# Patient Record
Sex: FEMALE | Race: WHITE | NOT HISPANIC OR LATINO
[De-identification: names, ages, dates, MRNs, and addresses within clinical notes are randomized per-mention and may not be internally consistent; named-entity substitution may affect disease eponyms.]

---

## 2021-06-23 PROBLEM — Z00.00 ENCOUNTER FOR PREVENTIVE HEALTH EXAMINATION: Status: ACTIVE | Noted: 2021-06-23

## 2021-07-13 ENCOUNTER — APPOINTMENT (OUTPATIENT)
Age: 56
End: 2021-07-13

## 2021-07-15 ENCOUNTER — APPOINTMENT (OUTPATIENT)
Age: 56
End: 2021-07-15

## 2021-07-15 ENCOUNTER — NON-APPOINTMENT (OUTPATIENT)
Age: 56
End: 2021-07-15

## 2021-07-20 ENCOUNTER — APPOINTMENT (OUTPATIENT)
Age: 56
End: 2021-07-20
Payer: MEDICAID

## 2021-07-20 VITALS
HEIGHT: 63.5 IN | SYSTOLIC BLOOD PRESSURE: 119 MMHG | DIASTOLIC BLOOD PRESSURE: 80 MMHG | TEMPERATURE: 97.1 F | OXYGEN SATURATION: 96 % | HEART RATE: 106 BPM | WEIGHT: 110 LBS | BODY MASS INDEX: 19.25 KG/M2

## 2021-07-20 DIAGNOSIS — D06.9 CARCINOMA IN SITU OF CERVIX, UNSPECIFIED: ICD-10-CM

## 2021-07-20 PROCEDURE — 99205 OFFICE O/P NEW HI 60 MIN: CPT

## 2021-07-20 NOTE — HISTORY OF PRESENT ILLNESS
[FreeTextEntry1] : Problem List\par 1. CRYSTAL 3\par \par Previous Therapy \par 1. PAP 5/5//21- HSIL, HPV +\par 1. Colposcopy 6/14/21\par    a) ECC- very scant benign endocervical cells. Endocervix is not well represented\par    b) cervix @3 oclock- detached superficial fragments of HGSIL (cin3) \par    c) cervix @ 6 - benign superficial squamous epithelim\par    d) cervix @ 10 - detached superficial fragments of HSIL (CRYSTAL 2-3) \par    e) cervix @ 12 - superificial fragments of HGSIL (CIN3)

## 2021-07-20 NOTE — CHIEF COMPLAINT
[FreeTextEntry1] : 54 y/o referred from Dr. Kidd for LEEP. PAP in March HGSIL/HPV+\par \par Patient reports episode of post coital bleeding in 2017. D&C in the office which was normal. \par \par Menopause 5 years ago\par \par OBHX:  x 1 (Twins)\par GYNHX: LEEP \par \par PMHX: major depression, anxiety, hypothyroid\par SX:\par \par MED: see med rec\par ALL: Ceftan\par \par SOCIAL: , not working, rare ETOH\par FAM: mother breast cancer Age 50

## 2021-07-20 NOTE — ASSESSMENT
[FreeTextEntry1] : With the aid of diagrams we reviewed the findings in detail.  We reviewed HPV its pathogenesis and the implications of an abnormal cervical cytology and the pathogenesis of dysplasia in detail.ASCUS with high-risk HPV is associated with 4% of Pap smears.\par \par It has been reported that 40 to 58 percent of CRYSTAL 2 lesions will regress if left untreated, while 22 percent progress to CRYSTAL 3, and 5 percent progress to invasive cancer. ASCCP guidelines were reviewed with the patient. Excision procedure is recommended for both CRYSTAL 2 and CIN3. \par \par The risks and benefits of LEEP vs. CKC were discussed which include, but are not limited to: bleeding, infection, cervical stenosis, cervical insufficiency. Possibility of needing a repeat procedure or hysterectomy was also discussed. I also discussed the possibility that no abnormality will be seen on the cone specimen. In this case, her cervix is extensively involved with high grade dysplasia, and child bearing is not of concern. Therefore, I recommend CKC.\par \par Patient requesting Gardasil vaccination. It is not currently approved in her age group as there are no appropraite studies to guide safety, efficacy and dosing schedules. \par \par \par [] Medical clearance\par [] COVID\par [] Pathology review\par [] CKC next MEETH day

## 2021-07-20 NOTE — PHYSICAL EXAM
[Normal] : Recto-Vaginal Exam: Normal [de-identified] : areas of high grade dysplasia noted circumferentially a

## 2021-07-21 DIAGNOSIS — K65.8 OTHER PERITONITIS: ICD-10-CM

## 2021-07-21 DIAGNOSIS — Z80.3 FAMILY HISTORY OF MALIGNANT NEOPLASM OF BREAST: ICD-10-CM

## 2021-07-21 DIAGNOSIS — Z86.79 PERSONAL HISTORY OF OTHER DISEASES OF THE CIRCULATORY SYSTEM: ICD-10-CM

## 2021-07-21 DIAGNOSIS — Z86.59 PERSONAL HISTORY OF OTHER MENTAL AND BEHAVIORAL DISORDERS: ICD-10-CM

## 2021-07-21 DIAGNOSIS — Z63.5 DISRUPTION OF FAMILY BY SEPARATION AND DIVORCE: ICD-10-CM

## 2021-07-21 DIAGNOSIS — Z78.9 OTHER SPECIFIED HEALTH STATUS: ICD-10-CM

## 2021-07-21 RX ORDER — CLONAZEPAM 0.5 MG/1
0.5 TABLET ORAL
Refills: 0 | Status: ACTIVE | COMMUNITY

## 2021-07-21 RX ORDER — LIOTHYRONINE SODIUM 5 UG/1
TABLET ORAL
Refills: 0 | Status: ACTIVE | COMMUNITY

## 2021-07-21 RX ORDER — SERTRALINE 25 MG/1
TABLET, FILM COATED ORAL
Refills: 0 | Status: ACTIVE | COMMUNITY

## 2021-07-21 SDOH — SOCIAL STABILITY - SOCIAL INSECURITY: DISRUPTION OF FAMILY BY SEPARATION AND DIVORCE: Z63.5

## 2021-07-23 ENCOUNTER — OUTPATIENT (OUTPATIENT)
Dept: OUTPATIENT SERVICES | Facility: HOSPITAL | Age: 56
LOS: 1 days | End: 2021-07-23
Payer: COMMERCIAL

## 2021-07-23 ENCOUNTER — RESULT REVIEW (OUTPATIENT)
Age: 56
End: 2021-07-23

## 2021-07-23 DIAGNOSIS — D06.9 CARCINOMA IN SITU OF CERVIX, UNSPECIFIED: ICD-10-CM

## 2021-07-23 PROCEDURE — 88321 CONSLTJ&REPRT SLD PREP ELSWR: CPT

## 2021-07-26 LAB — SURGICAL PATHOLOGY STUDY: SIGNIFICANT CHANGE UP

## 2021-08-15 ENCOUNTER — TRANSCRIPTION ENCOUNTER (OUTPATIENT)
Age: 56
End: 2021-08-15

## 2021-08-16 ENCOUNTER — APPOINTMENT (OUTPATIENT)
Age: 56
End: 2021-08-16

## 2021-08-16 ENCOUNTER — OUTPATIENT (OUTPATIENT)
Dept: OUTPATIENT SERVICES | Facility: HOSPITAL | Age: 56
LOS: 1 days | Discharge: HOME | End: 2021-08-16
Payer: COMMERCIAL

## 2021-08-16 ENCOUNTER — RESULT REVIEW (OUTPATIENT)
Age: 56
End: 2021-08-16

## 2021-08-16 LAB — SARS-COV-2 N GENE NPH QL NAA+PROBE: NOT DETECTED

## 2021-08-16 PROCEDURE — 88305 TISSUE EXAM BY PATHOLOGIST: CPT | Mod: 26

## 2021-08-16 PROCEDURE — 88342 IMHCHEM/IMCYTCHM 1ST ANTB: CPT | Mod: 26,59

## 2021-08-16 PROCEDURE — 57520 CONIZATION OF CERVIX: CPT

## 2021-08-16 PROCEDURE — 88341 IMHCHEM/IMCYTCHM EA ADD ANTB: CPT | Mod: 26,59

## 2021-08-16 PROCEDURE — 88360 TUMOR IMMUNOHISTOCHEM/MANUAL: CPT | Mod: 26

## 2021-08-16 PROCEDURE — 88307 TISSUE EXAM BY PATHOLOGIST: CPT | Mod: 26

## 2021-08-18 ENCOUNTER — TRANSCRIPTION ENCOUNTER (OUTPATIENT)
Age: 56
End: 2021-08-18

## 2021-08-19 LAB — SURGICAL PATHOLOGY STUDY: SIGNIFICANT CHANGE UP

## 2021-08-20 ENCOUNTER — NON-APPOINTMENT (OUTPATIENT)
Age: 56
End: 2021-08-20

## 2021-08-23 ENCOUNTER — TRANSCRIPTION ENCOUNTER (OUTPATIENT)
Age: 56
End: 2021-08-23

## 2021-08-24 ENCOUNTER — APPOINTMENT (OUTPATIENT)
Dept: GYNECOLOGIC ONCOLOGY | Facility: CLINIC | Age: 56
End: 2021-08-24

## 2021-09-14 ENCOUNTER — APPOINTMENT (OUTPATIENT)
Dept: GYNECOLOGIC ONCOLOGY | Facility: CLINIC | Age: 56
End: 2021-09-14

## 2021-09-17 ENCOUNTER — APPOINTMENT (OUTPATIENT)
Age: 56
End: 2021-09-17
Payer: MEDICAID

## 2021-09-17 VITALS
DIASTOLIC BLOOD PRESSURE: 78 MMHG | RESPIRATION RATE: 18 BRPM | BODY MASS INDEX: 19.25 KG/M2 | TEMPERATURE: 98.4 F | HEIGHT: 63.5 IN | OXYGEN SATURATION: 97 % | HEART RATE: 100 BPM | SYSTOLIC BLOOD PRESSURE: 112 MMHG | WEIGHT: 110 LBS

## 2021-09-17 DIAGNOSIS — Z71.89 OTHER SPECIFIED COUNSELING: ICD-10-CM

## 2021-09-17 PROCEDURE — 99215 OFFICE O/P EST HI 40 MIN: CPT | Mod: 24

## 2021-09-17 NOTE — ASSESSMENT
[FreeTextEntry1] : Appropriate 1 month recovery.\par \par Recommend an additional 2 weeks before intercourse. Can otherwise return to normal activity.\par \par Patient extensively counseled about HPV transmission, FDA indications for the Gardasil vaccine, and implications of HPV in H&N cancer. Many questions answered.\par \par ASCCP recommendations discussed. Guidelines recommend repeat PAP co-test in 1 year.

## 2021-09-17 NOTE — CHIEF COMPLAINT
[FreeTextEntry1] : 56 y/o referred from Dr. Kidd for LEEP. PAP in March HGSIL/HPV+\par \par Patient reports episode of post coital bleeding in 2017. D&C in the office which was normal. \par \par Menopause 5 years ago\par \par OBHX:  x 1 (Twins)\par GYNHX: LEEP \par \par PMHX: major depression, anxiety, hypothyroid\par SX:\par \par MED: see med rec\par ALL: Ceftan\par \par SOCIAL: , not working, rare ETOH\par FAM: mother breast cancer Age 50

## 2021-09-17 NOTE — HISTORY OF PRESENT ILLNESS
[FreeTextEntry1] : Problem List\par 1. CRYSTAL 3\par \par Previous Therapy \par 1. PAP 5/5//21- HSIL, HPV +\par 2. Colposcopy 6/14/21\par    a) ECC- very scant benign endocervical cells. Endocervix is not well represented\par    b) cervix @3 oclock- detached superficial fragments of HGSIL (cin3) \par    c) cervix @ 6 - benign superficial squamous epithelim\par    d) cervix @ 10 - detached superficial fragments of HSIL (CRYSTAL 2-3) \par    e) cervix @ 12 - superificial fragments of HGSIL (CIN3)\par 3. S/P CKC 8/16/21\par    a) Cervix, cone biopsy:\par - Transformation zone mucosa with previous procedure site-related change, negative for dysplasia\par - See comment\par \par Comment: The results of immunohistochemical stainings for p16 and Ki67 support the above diagnosis.\par \par    b)Endocervix, curettage:\par - Very rare unremarkable squamous epithelium

## 2021-09-17 NOTE — PHYSICAL EXAM
[Normal] : Recto-Vaginal Exam: Normal [de-identified] : small area of granulation tissue, vicryl stitches present, otherwise cervix normal in appearance and healed

## 2021-09-17 NOTE — REASON FOR VISIT
[FreeTextEntry1] : 1 month post op. Patient reports persistent discharge but has no other acute complaints.